# Patient Record
Sex: MALE | NOT HISPANIC OR LATINO | URBAN - METROPOLITAN AREA
[De-identification: names, ages, dates, MRNs, and addresses within clinical notes are randomized per-mention and may not be internally consistent; named-entity substitution may affect disease eponyms.]

---

## 2024-09-03 ENCOUNTER — OFFICE VISIT (OUTPATIENT)
Dept: URBAN - METROPOLITAN AREA CLINIC 113 | Facility: CLINIC | Age: 74
End: 2024-09-03
Payer: MEDICARE

## 2024-09-03 ENCOUNTER — DASHBOARD ENCOUNTERS (OUTPATIENT)
Age: 74
End: 2024-09-03

## 2024-09-03 VITALS
TEMPERATURE: 97.5 F | BODY MASS INDEX: 27.31 KG/M2 | SYSTOLIC BLOOD PRESSURE: 137 MMHG | RESPIRATION RATE: 12 BRPM | DIASTOLIC BLOOD PRESSURE: 76 MMHG | HEIGHT: 69 IN | HEART RATE: 38 BPM | WEIGHT: 184.4 LBS

## 2024-09-03 DIAGNOSIS — R00.1 BRADYCARDIA: ICD-10-CM

## 2024-09-03 DIAGNOSIS — Z12.11 COLON CANCER SCREENING: ICD-10-CM

## 2024-09-03 PROCEDURE — 99203 OFFICE O/P NEW LOW 30 MIN: CPT | Performed by: NURSE PRACTITIONER

## 2024-09-03 RX ORDER — HYDROCHLOROTHIAZIDE 25 MG/1
1 TABLET IN THE MORNING TABLET ORAL ONCE A DAY
Status: ACTIVE | COMMUNITY

## 2024-09-03 RX ORDER — GLUCOSAMINE/CHONDR SU A SOD 750-600 MG
AS DIRECTED TABLET ORAL
Status: ACTIVE | COMMUNITY

## 2024-09-03 RX ORDER — TESTOSTERONE 10 MG/G
2 PACKETS TO SKIN IN THE MORNING TO SHOULDER, UPPER ARMS OR ABDOMEN GEL TOPICAL ONCE A DAY
Status: ACTIVE | COMMUNITY

## 2024-09-03 RX ORDER — LOSARTAN POTASSIUM 50 MG/1
1 TABLET TABLET, FILM COATED ORAL ONCE A DAY
Status: ACTIVE | COMMUNITY

## 2024-09-03 RX ORDER — TAMSULOSIN HYDROCHLORIDE 0.4 MG/1
1 CAPSULE CAPSULE ORAL ONCE A DAY
Status: ACTIVE | COMMUNITY

## 2024-09-03 RX ORDER — TADALAFIL 20 MG/1
1 TABLET AS NEEDED TABLET, FILM COATED ORAL ONCE A DAY
Status: ACTIVE | COMMUNITY

## 2024-09-03 RX ORDER — AMLODIPINE BESYLATE 5 MG/1
1 TABLET TABLET ORAL ONCE A DAY
Status: ACTIVE | COMMUNITY

## 2024-09-03 NOTE — HPI-TODAY'S VISIT:
74 yo male referred by Dr. Oz Argueta for colon cancer screening. A copy of today's visit will be forwarded to the referring provider.  Referral notes from January 2024 also suggest substantial bradycardia in the mid 30s, for which he was referred to cardiology. He is being followed by cardiothoracic surgery (Dr. Raoul Osorio) for a stable, asymptomatic, ascending aortic aneurysm measuring 44 mm in greatest diameter. At his cardiothoracic surgery appointment in July, he was again noted to have significant bradycardia, however there are not any notes in [facility] from cardiology.  His last colonoscopy was performed at [facility] about 10 years ago. He thinks he had a history of colon polyps on his first colonosopy 20 years ago. He denies any polyps on his exam from 10 years ago. There is no family history of colon cancer. There is no dysphagia, heartburn, abdominal pain, nausea or vomiting. There is no blood per rectum or melena. He has bowel movements each day. He does not require any osmotic laxatives. No jaundice or icterus.  He does drink ETOH, estimated to be one drink per day. No tobacco use, or drug use. He does use Advil as needed for aches and pain or headaches, which is rare.   He tells me that his heart rate in the 30s is common for him. He describes a family history of bradycardia in his mother. She required a pacemaker.

## 2025-06-20 ENCOUNTER — TELEPHONE ENCOUNTER (OUTPATIENT)
Dept: URBAN - METROPOLITAN AREA CLINIC 113 | Facility: CLINIC | Age: 75
End: 2025-06-20

## 2025-08-22 ENCOUNTER — TELEPHONE ENCOUNTER (OUTPATIENT)
Dept: URBAN - METROPOLITAN AREA CLINIC 113 | Facility: CLINIC | Age: 75
End: 2025-08-22